# Patient Record
Sex: MALE | Race: OTHER | NOT HISPANIC OR LATINO | URBAN - METROPOLITAN AREA
[De-identification: names, ages, dates, MRNs, and addresses within clinical notes are randomized per-mention and may not be internally consistent; named-entity substitution may affect disease eponyms.]

---

## 2024-02-12 ENCOUNTER — EMERGENCY (EMERGENCY)
Facility: HOSPITAL | Age: 29
LOS: 1 days | Discharge: LEFT BEFORE DISCHARGE | End: 2024-02-12
Attending: STUDENT IN AN ORGANIZED HEALTH CARE EDUCATION/TRAINING PROGRAM
Payer: COMMERCIAL

## 2024-02-12 VITALS
HEART RATE: 68 BPM | OXYGEN SATURATION: 98 % | DIASTOLIC BLOOD PRESSURE: 80 MMHG | WEIGHT: 115.08 LBS | SYSTOLIC BLOOD PRESSURE: 125 MMHG | RESPIRATION RATE: 17 BRPM | TEMPERATURE: 98 F

## 2024-02-12 PROCEDURE — L9991: CPT

## 2024-02-12 NOTE — ED ADULT NURSE NOTE - OBJECTIVE STATEMENT
pt present to the Ed with c/o lower abdominal pain and haematuria . No acute distress noted at this time

## 2025-04-17 NOTE — ED ADULT TRIAGE NOTE - WEIGHT METHOD
Cardiac Catheterization Appropriate Use    History of Heart Failure? [x]  Yes  []  No     Newly Diagnosed? []  Yes  [x]  No     NYHA class    [] I  [] II  [x] III  [] IV            Stress Test Peformed * Important for AUC criteria [x]  Yes  []  No         If yes, specify test performed and must include risk of ischemia      StressTest Type   Test Result   Risk of Ischemia   [] Standard Exercise ST             (without imaging)  [] Stress Echo  [x] ST Nuclear   [] ST with CMR    [x] Positive                   []Negative  [] Indeterminate  [] Unavailable [] High   [x] Intermediate  [] Low  [] Unavailable        Indication(s) for Cath Lab Visit  (select all that apply)    [] ACS  [x] New onset angina<=2 months  [x] Worsening Angina  [] Resuscitated Cardiac Arrest   [] Stable Known CAD  [] Suspected CAD  [] Valvular Disease  [] Pericardial Disease  [] Pre-Operative Evaluation  []  Syncope []Post-Cardiac Transplant  []Cardiac Arrhythmias   [] Cardiomyopathy  [] LV dysfunction  [] Evaluation for Exercise Clearance  [] Other         Chest Pain Symptoms     [x] Typical Angina      []   Atypical                                Angina      [] Non-anginal Chest Pain []Asymptomatic      Cardiovascular Instability  [] Yes  [x]  No         If yes, specify instability type  (select all that apply)    [] Persistent Ischemic                     Symptoms(chest pain)   [] Hemodynamic Instability(not        Cardiogenic shock)  [] Cardiogenic Shock  [] Refractory Cardiogenic Shock   [] Acute Heart Failure Symptoms  [] Ventricular Arrhythmia        Evaluation for Surgery []  Cardiac Surgery        []  Non-Cardiac Surgery      Functional Capacity []<4 METS  []>= 4 METS without symptoms  [x]  >=4 mets with symptoms    []  Unknown     Surgical Risk []  Low     []  Intermediate    []High Risk Vascular  []  High Risk Non-Vascular       Rodriguez Mujica   PGY V  Cardiology Fellow                     
actual/standing